# Patient Record
Sex: MALE | Race: BLACK OR AFRICAN AMERICAN | URBAN - METROPOLITAN AREA
[De-identification: names, ages, dates, MRNs, and addresses within clinical notes are randomized per-mention and may not be internally consistent; named-entity substitution may affect disease eponyms.]

---

## 2018-07-11 PROBLEM — R97.20 ELEVATED PROSTATE SPECIFIC ANTIGEN (PSA): Status: ACTIVE | Noted: 2018-07-11

## 2022-03-19 PROBLEM — R97.20 ELEVATED PROSTATE SPECIFIC ANTIGEN (PSA): Status: ACTIVE | Noted: 2018-07-11

## 2022-05-16 ENCOUNTER — OFFICE VISIT (OUTPATIENT)
Dept: VASCULAR SURGERY | Age: 72
End: 2022-05-16
Payer: MEDICARE

## 2022-05-16 VITALS
HEART RATE: 92 BPM | SYSTOLIC BLOOD PRESSURE: 122 MMHG | HEIGHT: 71 IN | WEIGHT: 220.9 LBS | BODY MASS INDEX: 30.93 KG/M2 | OXYGEN SATURATION: 97 % | DIASTOLIC BLOOD PRESSURE: 82 MMHG

## 2022-05-16 DIAGNOSIS — I72.4 POPLITEAL ARTERY ANEURYSM (HCC): Primary | ICD-10-CM

## 2022-05-16 PROCEDURE — 99204 OFFICE O/P NEW MOD 45 MIN: CPT | Performed by: SURGERY

## 2022-05-16 PROCEDURE — G8427 DOCREV CUR MEDS BY ELIG CLIN: HCPCS | Performed by: SURGERY

## 2022-05-16 PROCEDURE — G8417 CALC BMI ABV UP PARAM F/U: HCPCS | Performed by: SURGERY

## 2022-05-16 PROCEDURE — 1101F PT FALLS ASSESS-DOCD LE1/YR: CPT | Performed by: SURGERY

## 2022-05-16 PROCEDURE — 1123F ACP DISCUSS/DSCN MKR DOCD: CPT | Performed by: SURGERY

## 2022-05-16 PROCEDURE — G8510 SCR DEP NEG, NO PLAN REQD: HCPCS | Performed by: SURGERY

## 2022-05-16 PROCEDURE — G8536 NO DOC ELDER MAL SCRN: HCPCS | Performed by: SURGERY

## 2022-05-16 PROCEDURE — 3017F COLORECTAL CA SCREEN DOC REV: CPT | Performed by: SURGERY

## 2022-05-16 NOTE — PROGRESS NOTES
1. Have you been to the ER, urgent care clinic since your last visit? No      Hospitalized since your last visit? No     2. Have you seen or consulted any other health care providers outside of the [address] [place] [address] since your last visit? Include any pap smears or colon screening.   No

## 2022-05-25 NOTE — PROGRESS NOTES
Gar Sport  Chief Complaint   Patient presents with    New Patient    Groin Pain       History and Physical    72 y/o M presents to establish care with a vascular surgeon. He reports that he was diagnosed with bilateral popliteal aneurysm approx 10 years ago. At that time he was recommended to undergo a possible angiogram to further evaluate. He was dealing with other medical issues at the time, and didn't follow up. He has been diagnosed with DVT/PE, and completed anticoagulation in the past.     Currently, he denies claudication or rest pain. He does not know if he has been evaluated for an AAA.      Past Medical History:   Diagnosis Date    Anemia 12/15/2012    BPH (benign prostatic hypertrophy)     Colon polyps     Elevated PSA     Erectile dysfunction     Multiple-type hyperlipidemia 7/10/2007    Personal history of prostate cancer     Sickle cell trait ([facility] [address]     UTI (urinary tract infection)      Patient Active Problem List   Diagnosis Code    BPH with obstruction/lower urinary tract symptoms N40.1, N13.8    ED (erectile dysfunction) of organic origin N52.9    Sickle cell trait (HCC) D57.3    Vascular disorder of lower extremity I99.9    Anemia D64.9    Chronic kidney disease, stage II (mild) N18.2    Pulmonary infarction ([facility] [address] I26.99    Osteoarthritis of knee M17.10    Multiple-type hyperlipidemia E78.2    Morbid obesity ([facility] [address] E66.01    Abnormal liver function tests R79.89    Popliteal artery aneurysm (HCC) I72.4    Pre-syncope R55    Primary localized osteoarthritis M19.91    Syncope and collapse R55    Tobacco use Z72.0    Elevated prostate specific antigen (PSA) R97.20     Past Surgical History:   Procedure Laterality Date    BIOPSY PROSTATE       Current Outpatient Medications   Medication Sig Dispense Refill    clindamycin-benzoyl peroxide (BENZACLIN) 1-5 % topical gel APPLY TO FACE ONCE DAILY AFTER WASHING      ibuprofen (MOTRIN) 800 mg tablet Take 800 mg by mouth every six (6) hours as needed.  silodosin (RAPAFLO) 8 mg capsule Take 1 Capsule by mouth daily (with dinner). 90 Capsule 3    tadalafiL (CIALIS) 20 mg tablet Take 1 Tablet by mouth daily as needed (erectile dysfunction). 30 Tablet 5    tadalafiL (CIALIS) 5 mg tablet Take 1 Tablet by mouth daily. 90 Tablet 3    multivitamin (ONE A DAY) tablet Take 1 Tab by mouth daily.  OMEPRAZOLE MAGNESIUM (PRILOSEC OTC PO) Take  by mouth.  valACYclovir (VALTREX) 500 mg tablet   0     Allergies   Allergen Reactions    Avodart [Dutasteride] Shortness of Breath and Cough     Lightheaded    Oxycodone Nausea and Vomiting    Rosuvastatin Other (comments)     'flu'        Social History     Tobacco Use    Smoking status: Never Smoker    Smokeless tobacco: Never Used   Substance Use Topics    Alcohol use: Yes     Alcohol/week: 0.0 standard drinks     Comment: occ    Drug use: Not on file        Family History   Problem Relation Age of Onset    Heart Disease Father     Diabetes Sister     Diabetes Brother     Diabetes Maternal Grandmother        Review of Systems    General: negative for fever   Eyes: negative for vision loss   HENT: negative for cold symptoms   Respiratory negative for shortness of breath   Cardiac: negative for chest pain   Vascular negative for foot pain at night    Gastrointestinal: negative for abdominal pain   Genitourinary: negative for dysuria    Endocrine: negative for excessive thirst   Skin: negative for rash   Neurological: negative for paralysis   Psychiatric: negative for depression        Physical Exam:    Visit Vitals  /82 (BP 1 Location: Left arm, BP Patient Position: Sitting)   Pulse 92   Ht 5' 11\" (1.803 m)   Wt 220 lb 14.4 oz (100.2 kg)   SpO2 97%   BMI 30.81 kg/m²        Constitutional:  Patient is well developed, well nourished, and not distressed. HEENT: atraumatic, normocephalic, wearing a mask.    Eyes:   Cunjunctivae clear, no scleral icterus  Cardiovascular:  Normal rate, regular rhythm, normal heart sounds. Pulmonary/Chest: Effort normal and breath sounds normal.  he has no wheezes or no rales. Abdominal:   Soft. No tenderness to palpation. No masses. Extremities:   BLE warm. Pedal pulses not palpable bilaterally. Popliteal pulses are faintly palpable bilaterally, but do not feel aneurysmal on my exam. No edema of either lower extremity. Neurological:  he  is alert and oriented x3 Gait normal. Motor & sensory grossly intact in all 4 limbs. Psych: Appropriate mood and affect. Imaging/Studies:   None today. Impression:  Prior history of BLE popliteal aneurysms diagnosed an an outside institution. Asymptomatic currently. Signs of PAD on physical exam.     Plan: Will obtain MELCHOR, BLE arterial duplex to eval popliteal arteries, and will also screen for AAA. RTC after studies to discuss findings. Elisabeth Nguyen MD  Vascular Surgeon    I spent approximately 40 minutes on this patient encounter, consisting of reviewing the patient's chart, including imaging, primary care and consultant documentation, performing a history and physical exam, and speaking with him regarding my impression and plan.

## 2022-05-30 DIAGNOSIS — I72.4 POPLITEAL ARTERY ANEURYSM (HCC): ICD-10-CM

## 2022-06-01 LAB
ABDOMINAL AP: 2.3 CM
ABDOMINAL DIST AORTA VEL: 62.5 CM/S
ABDOMINAL LT COM ILIAC AP: 1.11 CM
ABDOMINAL LT INT ILIAC VEL: 84.3 CM/S
ABDOMINAL MID AORTA VEL: 50.2 CM/S
ABDOMINAL RT COM ILIAC AP: 1.28 CM
ABDOMINAL RT INT ILIAC VEL: 49 CM/S
ABDOMINAL TRANS: 3 CM
DIST AORTIC AP: 2.3 CM
DIST AORTIC TRANS: 3 CM
LEFT ABI: 1.19
LEFT ARM BP: 138 MMHG
LEFT COM ILIAC DIST VEL: 87.9 CM/S
LEFT COM ILIAC MID VEL: 64.6 CM/S
LEFT COM ILIAC PROX VEL: 56 CM/S
LEFT EXT ILIAC DIST VEL RATIO: 0.99
LEFT EXT ILIAC DIST VEL: 81.3 CM/S
LEFT EXT ILIAC MID VEL RATIO: 1
LEFT EXT ILIAC MID VEL: 82.5 CM/S
LEFT EXT ILIAC PROX VEL RATIO: 0.94
LEFT EXT ILIAC PROX VEL: 82.5 CM/S
LEFT POSTERIOR TIBIAL: 138 MMHG
LEFT TBI: 0.79
LEFT TOE PRESSURE: 109 MMHG
RIGHT ABI: 0.79
RIGHT ARM BP: 130 MMHG
RIGHT EXT ILIAC DIST VEL RATIO: 1
RIGHT EXT ILIAC DIST VEL: 79.7 CM/S
RIGHT EXT ILIAC MID VEL: 78.5 CM/S
RIGHT POSTERIOR TIBIAL: 109 MMHG
RIGHT TBI: 0.51
RIGHT TOE PRESSURE: 71 MMHG
VAS LEFT DORSALIS PEDIS BP: 164 MMHG
VAS RIGHT DORSALIS PEDIS BP: 101 MMHG

## 2022-06-20 ENCOUNTER — OFFICE VISIT (OUTPATIENT)
Dept: VASCULAR SURGERY | Age: 72
End: 2022-06-20
Payer: MEDICARE

## 2022-06-20 VITALS
BODY MASS INDEX: 30.93 KG/M2 | HEART RATE: 77 BPM | SYSTOLIC BLOOD PRESSURE: 130 MMHG | HEIGHT: 71 IN | WEIGHT: 220.9 LBS | OXYGEN SATURATION: 97 % | DIASTOLIC BLOOD PRESSURE: 86 MMHG

## 2022-06-20 DIAGNOSIS — M24.541 CONTRACTURE OF JOINT OF RIGHT HAND: ICD-10-CM

## 2022-06-20 DIAGNOSIS — I72.4 POPLITEAL ARTERY ANEURYSM (HCC): Primary | ICD-10-CM

## 2022-06-20 DIAGNOSIS — I73.9 PAD (PERIPHERAL ARTERY DISEASE) (HCC): ICD-10-CM

## 2022-06-20 DIAGNOSIS — I71.40 AAA (ABDOMINAL AORTIC ANEURYSM) WITHOUT RUPTURE: ICD-10-CM

## 2022-06-20 PROCEDURE — G8510 SCR DEP NEG, NO PLAN REQD: HCPCS | Performed by: SURGERY

## 2022-06-20 PROCEDURE — 3017F COLORECTAL CA SCREEN DOC REV: CPT | Performed by: SURGERY

## 2022-06-20 PROCEDURE — 99213 OFFICE O/P EST LOW 20 MIN: CPT | Performed by: SURGERY

## 2022-06-20 PROCEDURE — G8417 CALC BMI ABV UP PARAM F/U: HCPCS | Performed by: SURGERY

## 2022-06-20 PROCEDURE — G8427 DOCREV CUR MEDS BY ELIG CLIN: HCPCS | Performed by: SURGERY

## 2022-06-20 PROCEDURE — G8536 NO DOC ELDER MAL SCRN: HCPCS | Performed by: SURGERY

## 2022-06-20 PROCEDURE — 1101F PT FALLS ASSESS-DOCD LE1/YR: CPT | Performed by: SURGERY

## 2022-06-20 PROCEDURE — 1123F ACP DISCUSS/DSCN MKR DOCD: CPT | Performed by: SURGERY

## 2022-06-20 NOTE — PROGRESS NOTES
Herve Ma  Chief Complaint   Patient presents with    New Patient    Carotid Artery Stenosis       History and Physical    72 y/o M presents for follow up of recent arterial studies. At the last visit, he reported a hx of possible BLE popliteal aneurysms, which had been diagnosed several years ago. No changes since his last visit. He continues to walk for exercise and perform house and yard work frequently. When he walks with his wife, she walks faster and further. He has a hard time keeping up with her, due to cramping in his RLE, which resolves after rest. He does not feel that this is limiting his lifestyle in any way. He notes that he has an abnormality of his R hand that he would like to have evaluated.      Past Medical History:   Diagnosis Date    Anemia 12/15/2012    BPH (benign prostatic hypertrophy)     Colon polyps     Elevated PSA     Erectile dysfunction     Multiple-type hyperlipidemia 7/10/2007    Personal history of prostate cancer     Sickle cell trait ([facility] [address]     UTI (urinary tract infection)      Patient Active Problem List   Diagnosis Code    BPH with obstruction/lower urinary tract symptoms N40.1, N13.8    ED (erectile dysfunction) of organic origin N52.9    Sickle cell trait (HCC) D57.3    Vascular disorder of lower extremity I99.9    Anemia D64.9    Chronic kidney disease, stage II (mild) N18.2    Pulmonary infarction ([facility] [address] I26.99    Osteoarthritis of knee M17.10    Multiple-type hyperlipidemia E78.2    Morbid obesity ([facility] [address] E66.01    Abnormal liver function tests R79.89    Popliteal artery aneurysm (HCC) I72.4    Pre-syncope R55    Primary localized osteoarthritis M19.91    Syncope and collapse R55    Tobacco use Z72.0    Elevated prostate specific antigen (PSA) R97.20     Past Surgical History:   Procedure Laterality Date    BIOPSY PROSTATE       Current Outpatient Medications   Medication Sig Dispense Refill    clindamycin-benzoyl peroxide (BENZACLIN) 1-5 % topical gel APPLY TO FACE ONCE DAILY AFTER WASHING      ibuprofen (MOTRIN) 800 mg tablet Take 800 mg by mouth every six (6) hours as needed.  silodosin (RAPAFLO) 8 mg capsule Take 1 Capsule by mouth daily (with dinner). 90 Capsule 3    tadalafiL (CIALIS) 20 mg tablet Take 1 Tablet by mouth daily as needed (erectile dysfunction). 30 Tablet 5    tadalafiL (CIALIS) 5 mg tablet Take 1 Tablet by mouth daily. 90 Tablet 3    multivitamin (ONE A DAY) tablet Take 1 Tab by mouth daily.  OMEPRAZOLE MAGNESIUM (PRILOSEC OTC PO) Take  by mouth.  valACYclovir (VALTREX) 500 mg tablet   0     Allergies   Allergen Reactions    Avodart [Dutasteride] Shortness of Breath and Cough     Lightheaded    Oxycodone Nausea and Vomiting    Rosuvastatin Other (comments)     'flu'        Social History     Tobacco Use    Smoking status: Never Smoker    Smokeless tobacco: Never Used   Substance Use Topics    Alcohol use: Yes     Alcohol/week: 0.0 standard drinks     Comment: occ    Drug use: Not on file        Family History   Problem Relation Age of Onset    Heart Disease Father     Hypertension Father     Diabetes Sister     Diabetes Brother     Diabetes Maternal Grandmother        Review of Systems    General: negative for fever   Eyes: negative for vision loss   HENT: negative for cold symptoms   Respiratory negative for shortness of breath   Cardiac: negative for chest pain   Vascular negative for foot pain at night    Gastrointestinal: negative for abdominal pain   Genitourinary: negative for dysuria    Endocrine: negative for excessive thirst   Skin: negative for rash   Neurological: negative for paralysis   Psychiatric: negative for depression        Physical Exam:    Visit Vitals  Ht 5' 11\" (1.803 m)   Wt 220 lb 14.4 oz (100.2 kg)   BMI 30.81 kg/m²        Constitutional:  Patient is well developed, well nourished, and not distressed. HEENT: Atraumatic, normocephalic, wearing a mask.    Eyes: Cunjunctivae clear, no scleral icterus  Cardiovascular:  Normal rate, regular rhythm, normal heart sounds. Pulmonary/Chest: Effort normal and breath sounds normal.  he has no wheezes or no rales. Abdominal:   Soft, non-distended. No tenderness to palpation. No masses palpated. Extremities:   BLE warm. LLE pedal pulses palpable. RLE pedal pulses not appreciated. No atrophy or skin changes bilaterally. RUE with contracture of R 5th digit. Neurological:  he  is alert and oriented x3 Gait normal. Motor & sensory grossly intact in all 4 limbs. Psych: Appropriate mood and affect. Imaging/Studies:   5/16/22:   MELCHOR: RLE 0.79, L 1.19. Normal toe pressures. RLE occluded 1.6cm popliteal artery aneurysm. No sign of LLE popliteal aneurysm. Abdominal aorta duplex: aorta measures 2.3 x 3 cm. Poor visualization due to intestinal gas. Impression:  1. Occluded RLE small popliteal aneurysm with minimal outflow disease. 2. Small asymptomatic AAA  3. Chronic R hand pain with contracture      Plan:  Repeat MELCHOR and AAA duplex in 1 year  Will refer for evaluation by orthopedic hand surgery (Dr. Mack Harada)      Aline Sanabria MD  Vascular Surgeon      I spent approximately 20 minutes on this patient encounter, which includes but is not limited to performing a history and physical exam, reviewing primary care and consultant documentation, reviewing imaging/studies, and speaking with him regarding my impression and plan.

## 2022-06-20 NOTE — PROGRESS NOTES
1. Have you been to the ER, urgent care clinic since your last visit? No       Hospitalized since your last visit? No     2. Have you seen or consulted any other health care providers outside of the [address] [place] [address] since your last visit? Include any pap smears or colon screening.   No

## 2022-07-01 PROBLEM — I71.40 AAA (ABDOMINAL AORTIC ANEURYSM) WITHOUT RUPTURE: Status: ACTIVE | Noted: 2022-07-01

## 2022-07-01 PROBLEM — I73.9 PAD (PERIPHERAL ARTERY DISEASE) (HCC): Status: ACTIVE | Noted: 2022-07-01

## 2022-07-01 PROBLEM — I71.40 AAA (ABDOMINAL AORTIC ANEURYSM) WITHOUT RUPTURE (HCC): Status: ACTIVE | Noted: 2022-07-01

## 2022-07-02 PROBLEM — M24.549 CONTRACTURE OF JOINT OF HAND: Status: ACTIVE | Noted: 2022-07-02

## 2022-07-27 ENCOUNTER — OFFICE VISIT (OUTPATIENT)
Dept: ORTHOPEDIC SURGERY | Age: 72
End: 2022-07-27
Payer: MEDICARE

## 2022-07-27 VITALS — HEIGHT: 71 IN | TEMPERATURE: 97.8 F | WEIGHT: 220 LBS | BODY MASS INDEX: 30.8 KG/M2

## 2022-07-27 DIAGNOSIS — M15.2 DEGENERATIVE ARTHRITIS OF PROXIMAL INTERPHALANGEAL JOINT OF RING FINGER OF RIGHT HAND: ICD-10-CM

## 2022-07-27 DIAGNOSIS — M15.2 DEGENERATIVE ARTHRITIS OF PROXIMAL INTERPHALANGEAL JOINT OF LITTLE FINGER OF RIGHT HAND: ICD-10-CM

## 2022-07-27 DIAGNOSIS — S63.274S: Primary | ICD-10-CM

## 2022-07-27 PROCEDURE — 99203 OFFICE O/P NEW LOW 30 MIN: CPT | Performed by: ORTHOPAEDIC SURGERY

## 2022-07-27 PROCEDURE — 73130 X-RAY EXAM OF HAND: CPT | Performed by: ORTHOPAEDIC SURGERY

## 2022-07-27 PROCEDURE — G8417 CALC BMI ABV UP PARAM F/U: HCPCS | Performed by: ORTHOPAEDIC SURGERY

## 2022-07-27 PROCEDURE — G8432 DEP SCR NOT DOC, RNG: HCPCS | Performed by: ORTHOPAEDIC SURGERY

## 2022-07-27 PROCEDURE — 1123F ACP DISCUSS/DSCN MKR DOCD: CPT | Performed by: ORTHOPAEDIC SURGERY

## 2022-07-27 PROCEDURE — G8427 DOCREV CUR MEDS BY ELIG CLIN: HCPCS | Performed by: ORTHOPAEDIC SURGERY

## 2022-07-27 PROCEDURE — 1101F PT FALLS ASSESS-DOCD LE1/YR: CPT | Performed by: ORTHOPAEDIC SURGERY

## 2022-07-27 PROCEDURE — G8536 NO DOC ELDER MAL SCRN: HCPCS | Performed by: ORTHOPAEDIC SURGERY

## 2022-07-27 PROCEDURE — 3017F COLORECTAL CA SCREEN DOC REV: CPT | Performed by: ORTHOPAEDIC SURGERY

## 2022-07-27 NOTE — PROGRESS NOTES
Latonya Acuña is a 73 y.o. male right handed retiree. Worker's Compensation and legal considerations: none filed. Vitals:    07/27/22 1054   Temp: 97.8 °F (36.6 °C)   TempSrc: Temporal   Weight: 220 lb (99.8 kg)   Height: 5' 11\" (1.803 m)   PainSc:   0 - No pain   PainLoc: Hand           Chief Complaint   Patient presents with    Hand Pain     Contracture of joint rt hand         HPI: Patient presents today with complaints of contracture in right hand. He reports dislocating his fingers years ago and has had some difficulty with motion but minimal pain. Date of onset: Chronic    Injury: No    Prior Treatment:  No    Numbness/ Tingling: No      ROS: Review of Systems - General ROS: negative  Psychological ROS: negative  ENT ROS: negative  Allergy and Immunology ROS: negative  Hematological and Lymphatic ROS: negative  Respiratory ROS: no cough, shortness of breath, or wheezing  Cardiovascular ROS: no chest pain or dyspnea on exertion  Gastrointestinal ROS: no abdominal pain, change in bowel habits, or black or bloody stools  Musculoskeletal ROS: negative  Neurological ROS: negative  Dermatological ROS: negative    Past Medical History:   Diagnosis Date    Anemia 12/15/2012    BPH (benign prostatic hypertrophy)     Colon polyps     Elevated PSA     Erectile dysfunction     Multiple-type hyperlipidemia 7/10/2007    Personal history of prostate cancer     Sickle cell trait (HCC)     UTI (urinary tract infection)        Past Surgical History:   Procedure Laterality Date    BIOPSY PROSTATE         Current Outpatient Medications   Medication Sig Dispense Refill    clindamycin-benzoyl peroxide (BENZACLIN) 1-5 % topical gel APPLY TO FACE ONCE DAILY AFTER WASHING      ibuprofen (MOTRIN) 800 mg tablet Take 800 mg by mouth every six (6) hours as needed.  silodosin (RAPAFLO) 8 mg capsule Take 1 Capsule by mouth daily (with dinner).  90 Capsule 3    tadalafiL (CIALIS) 20 mg tablet Take 1 Tablet by mouth daily as needed (erectile dysfunction). 30 Tablet 5    tadalafiL (CIALIS) 5 mg tablet Take 1 Tablet by mouth daily. 90 Tablet 3    multivitamin (ONE A DAY) tablet Take 1 Tab by mouth daily.  OMEPRAZOLE MAGNESIUM (PRILOSEC OTC PO) Take  by mouth.  valACYclovir (VALTREX) 500 mg tablet   0       Allergies   Allergen Reactions    Avodart [Dutasteride] Shortness of Breath and Cough     Lightheaded    Oxycodone Nausea and Vomiting    Rosuvastatin Other (comments)     'flu'           PE:     Physical Exam  Vitals and nursing note reviewed. Constitutional:       General: He is not in acute distress. Appearance: Normal appearance. He is not ill-appearing. Cardiovascular:      Pulses: Normal pulses. Pulmonary:      Effort: Pulmonary effort is normal. No respiratory distress. Musculoskeletal:         General: Swelling and deformity present. No tenderness or signs of injury. Cervical back: Normal range of motion and neck supple. Right lower leg: No edema. Left lower leg: No edema. Skin:     General: Skin is warm and dry. Capillary Refill: Capillary refill takes less than 2 seconds. Neurological:      General: No focal deficit present. Mental Status: He is alert and oriented to person, place, and time. Right hand: There are chronic deformities to the PIP joints of the right little and ring fingers. These are not passively correctable. Imagin2022 3 views of right hand is significant for degenerative changes at the PIP joints of the little and ring fingers with chronic subluxation noted. ICD-10-CM ICD-9-CM    1. Dislocation of interphalangeal joint of right ring finger, sequela  S63.274S 905.6 AMB POC XRAY, HAND; 3+ VIEWS      2. 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  Degenerative arthritis of proximal interphalangeal joint of little finger of right hand  M15.2 715.94 Plan:     Anti-inflammatories and ice as needed. We discussed injections in the future if pain worsens. Follow-up and Dispositions    Return if symptoms worsen or fail to improve.           Plan was reviewed with patient, who verbalized agreement and understanding of the plan

## 2022-12-06 ENCOUNTER — TELEPHONE (OUTPATIENT)
Dept: VASCULAR SURGERY | Age: 72
End: 2022-12-06

## 2022-12-06 NOTE — TELEPHONE ENCOUNTER
Attempted to return patient's call at this time; no answer noted at number given; vm left to contact this office.

## 2022-12-07 ENCOUNTER — OFFICE VISIT (OUTPATIENT)
Dept: VASCULAR SURGERY | Age: 72
End: 2022-12-07
Payer: MEDICARE

## 2022-12-07 VITALS
WEIGHT: 218.03 LBS | SYSTOLIC BLOOD PRESSURE: 124 MMHG | DIASTOLIC BLOOD PRESSURE: 64 MMHG | HEIGHT: 71 IN | BODY MASS INDEX: 30.52 KG/M2 | OXYGEN SATURATION: 98 % | HEART RATE: 84 BPM

## 2022-12-07 DIAGNOSIS — I71.43 INFRARENAL ABDOMINAL AORTIC ANEURYSM (AAA) WITHOUT RUPTURE: Primary | ICD-10-CM

## 2022-12-07 DIAGNOSIS — I73.9 PAD (PERIPHERAL ARTERY DISEASE) (HCC): ICD-10-CM

## 2022-12-07 PROCEDURE — 3017F COLORECTAL CA SCREEN DOC REV: CPT | Performed by: PHYSICIAN ASSISTANT

## 2022-12-07 PROCEDURE — 1123F ACP DISCUSS/DSCN MKR DOCD: CPT | Performed by: PHYSICIAN ASSISTANT

## 2022-12-07 PROCEDURE — G8417 CALC BMI ABV UP PARAM F/U: HCPCS | Performed by: PHYSICIAN ASSISTANT

## 2022-12-07 PROCEDURE — G8536 NO DOC ELDER MAL SCRN: HCPCS | Performed by: PHYSICIAN ASSISTANT

## 2022-12-07 PROCEDURE — 99214 OFFICE O/P EST MOD 30 MIN: CPT | Performed by: PHYSICIAN ASSISTANT

## 2022-12-07 PROCEDURE — G8427 DOCREV CUR MEDS BY ELIG CLIN: HCPCS | Performed by: PHYSICIAN ASSISTANT

## 2022-12-07 PROCEDURE — 1101F PT FALLS ASSESS-DOCD LE1/YR: CPT | Performed by: PHYSICIAN ASSISTANT

## 2022-12-07 PROCEDURE — G8432 DEP SCR NOT DOC, RNG: HCPCS | Performed by: PHYSICIAN ASSISTANT

## 2022-12-07 NOTE — PROGRESS NOTES
Deepthishyann Celaya  Chief Complaint   Patient presents with    Follow-up    Abdominal Aortic Aneurysm       History and Physical    Kev Resendiz is a pleasant 74 y/o male presents for follow up known peripheral vascular disease is known to our practice from previous evaluation for his known AAA. Patient states that he was recently seen and was free of any symptoms, but since his last visit he has developed pain in his left thigh and radiating to his calf at short distance ambulation. Patient states that he can walk about 1 block and is thigh starts to burning pain him. Patient states that prior to this he has been very active, states that he walks frequently about a mile with his wife, and also bikes frequently. Patient states over the last couple weeks he has noticed substantial pain which is affected his ability to do these activities. . At the last visit, he reported a hx of possible BLE popliteal aneurysms, which had been diagnosed several years ago. Patient states that he continues to take his meds as recommended, denies taking a daily aspirin or statin medication. He states that since last visit he feels that this has gotten worse and that he is starting to affect his activities of daily living. On last visit, it was noted from the previous providers note :  No changes since his last visit. He continues to walk for exercise and perform house and yard work frequently. When he walks with his wife, she walks faster and further. He has a hard time keeping up with her, due to cramping in his RLE, which resolves after rest. He does not feel that this is limiting his lifestyle in any way. He notes that he has an abnormality of his R hand that he would like to have evaluated.      Past Medical History:   Diagnosis Date    Anemia 12/15/2012    BPH (benign prostatic hypertrophy)     Colon polyps     Elevated PSA     Erectile dysfunction     Multiple-type hyperlipidemia 7/10/2007    Personal history of prostate cancer Sickle cell trait ([facility])     UTI (urinary tract infection)      Patient Active Problem List   Diagnosis Code    BPH with obstruction/lower urinary tract symptoms N40.1, N13.8    ED (erectile dysfunction) of organic origin N52.9    Sickle cell trait ([facility]) D57.3    Vascular disorder of lower extremity I99.9    Anemia D64.9    Chronic kidney disease, stage II (mild) N18.2    Pulmonary infarction ([facility]) I26.99    Osteoarthritis of knee M17.9    Multiple-type hyperlipidemia E78.2    Morbid obesity ([facility]) E66.01    Abnormal liver function tests R79.89    Popliteal artery aneurysm ([facility]) I72.4    Pre-syncope R55    Primary localized osteoarthritis M19.91    Syncope and collapse R55    Tobacco use Z72.0    Elevated prostate specific antigen (PSA) R97.20    AAA (abdominal aortic aneurysm) without rupture I71.40    PAD (peripheral artery disease) ([facility]) I73.9    Contracture of joint of hand M24.549     Past Surgical History:   Procedure Laterality Date    BIOPSY PROSTATE      HX UROLOGICAL  09/01/2022    TRANSRECTAL ULTRASOUND GUIDED BIOPSY OF PROSTATE; Dr. Pattie Gant     Current Outpatient Medications   Medication Sig Dispense Refill    trimethoprim-sulfamethoxazole (BACTRIM DS, SEPTRA DS) 160-800 mg per tablet Take 1 tablet two hours prior to TRUS biopsy. Take second tablet 12 hours after TRUS biopsy. 2 Tablet 0    clindamycin-benzoyl peroxide (BENZACLIN) 1-5 % topical gel APPLY TO FACE ONCE DAILY AFTER WASHING      ibuprofen (MOTRIN) 800 mg tablet Take 800 mg by mouth every six (6) hours as needed. silodosin (RAPAFLO) 8 mg capsule Take 1 Capsule by mouth daily (with dinner). 90 Capsule 3    tadalafiL (CIALIS) 20 mg tablet Take 1 Tablet by mouth daily as needed (erectile dysfunction). 30 Tablet 5    tadalafiL (CIALIS) 5 mg tablet Take 1 Tablet by mouth daily. 90 Tablet 3    multivitamin (ONE A DAY) tablet Take 1 Tab by mouth daily. OMEPRAZOLE MAGNESIUM (PRILOSEC OTC PO) Take  by mouth.       valACYclovir (VALTREX) 500 mg tablet   0     Allergies   Allergen Reactions    Avodart [Dutasteride] Shortness of Breath and Cough     Lightheaded    Oxycodone Nausea and Vomiting    Rosuvastatin Other (comments)     'flu'        Social History     Tobacco Use    Smoking status: Never    Smokeless tobacco: Never   Substance Use Topics    Alcohol use: Yes     Alcohol/week: 0.0 standard drinks     Comment: occ        Family History   Problem Relation Age of Onset    Heart Disease Father     Hypertension Father     Diabetes Sister     Diabetes Brother     Diabetes Maternal Grandmother        Review of Systems    General: negative for fever-chills. Ambulates daily but is experiencing left leg pain and discomfort at 1-2 blocks ambulation. Denies any recent ulceration skin rashes or skin lesions in either lower extremity. Eyes: negative for vision loss   HENT: negative for cold symptoms   Respiratory negative for shortness of breath   Cardiac: negative for chest pain   Vascular negative for foot pain at night    Gastrointestinal: negative for abdominal pain   Genitourinary: negative for dysuria    Endocrine: negative for excessive thirst   Skin: negative for rash   Neurological: negative for paralysis   Psychiatric: negative for depression        Physical Exam:    Visit Vitals  /64 (BP 1 Location: Left arm, BP Patient Position: Sitting)   Pulse 84   Ht 5' 11\" (1.803 m)   Wt 218 lb 0.6 oz (98.9 kg)   SpO2 98%   BMI 30.41 kg/m²        Constitutional:  Patient is well developed, well nourished, and not distressed. Ambulated into the room albeit slowly with a right-sided limp but normal steady gait. HEENT: Atraumatic, normocephalic, wearing a mask. Eyes:   Cunjunctivae clear, no scleral icterus  Cardiovascular:  Normal rate, regular rhythm, normal heart sounds. Pulmonary/Chest: Effort normal and breath sounds normal.  he has no wheezes or no rales. Abdominal:   Soft, non-distended. No tenderness to palpation.  No masses palpated. Extremities:   BLE warm. LLE pedal pulses non-palpable. RLE pedal pulses not palpable. Easily obtain monophasic signal noted bilaterally. No atrophy or skin changes bilaterally. RUE with contracture of R 5th digit. Palpable radial pulses noted bilaterally. Neurovascularly intact to both soft and deep sensation bilaterally. Neurological: Patient is alert and oriented x3 Gait normal. Motor & sensory grossly intact in all 4 limbs. No focal deficits noted  Psych: Appropriate mood and affect. Imaging/Studies:   5/16/22: Studies  MELCHOR: RLE 0.79, L 1.19. Normal toe pressures. RLE occluded 1.6cm popliteal artery aneurysm. No sign of LLE popliteal aneurysm. Abdominal aorta duplex: aorta measures 2.3 x 3 cm. Poor visualization due to intestinal gas. Impression  - peripheral vascular disease  Continue present Rx -maintain daily meds as ordered, patient instructed to start baby aspirin 81 mg daily for vascular protection. Patient to discuss with his primary care doctor. CTA of the abdomen pelvis with bilateral runoffs ordered, follow-up in 2 to 3 weeks post imaging to discuss results and whether any intervention is needed. Instructed noted from previous imaging  1. Occluded RLE small popliteal aneurysm with minimal outflow disease. 2. Small asymptomatic AAA  3. Chronic R hand pain with contracture      Plan:  CTA of the abdomen and pelvis as soon as possible and follow-up to discuss results  Repeat  AAA duplex in 1 year  Patient instructed in the meantime to make better lifestyle choices, better nutritional choices, increasing his daily activity as tolerated, core smoking cessation. Patient states he understands above, is very appreciative of our service, and is willing to proceed as planned. Follow-up in 2 to 3 weeks with results review. On next visit we will discuss results and see if the patient needs any vascular intervention.       Lisa Kumar PA-C       I spent approximately 20 minutes on this patient encounter, which includes but is not limited to performing a history and physical exam, reviewing primary care and consultant documentation, reviewing imaging/studies, and speaking with him regarding my impression and plan.

## 2022-12-07 NOTE — PROGRESS NOTES
1. Have you been to the ER, urgent care clinic since your last visit? No     Hospitalized since your last visit? No     2. Have you seen or consulted any other health care providers outside of the [address] [place] [address] since your last visit? Include any pap smears or colon screening.   No

## 2022-12-21 DIAGNOSIS — I71.43 INFRARENAL ABDOMINAL AORTIC ANEURYSM (AAA) WITHOUT RUPTURE: ICD-10-CM

## 2022-12-21 DIAGNOSIS — I73.9 PAD (PERIPHERAL ARTERY DISEASE) (HCC): ICD-10-CM

## 2022-12-23 ENCOUNTER — HOSPITAL ENCOUNTER (OUTPATIENT)
Dept: CT IMAGING | Age: 72
Discharge: HOME | End: 2022-12-23
Attending: PHYSICIAN ASSISTANT
Payer: MEDICARE

## 2022-12-23 LAB — CREAT UR-MCNC: 1.5 MG/DL (ref 0.6–1.3)

## 2022-12-23 PROCEDURE — 82565 ASSAY OF CREATININE: CPT

## 2022-12-23 PROCEDURE — 75635 CT ANGIO ABDOMINAL ARTERIES: CPT

## 2022-12-23 PROCEDURE — 74011000636 HC RX REV CODE- 636: Performed by: PHYSICIAN ASSISTANT

## 2022-12-23 RX ADMIN — IOPAMIDOL 96 ML: 755 INJECTION, SOLUTION INTRAVENOUS at 10:23

## 2022-12-30 ENCOUNTER — OFFICE VISIT (OUTPATIENT)
Dept: VASCULAR SURGERY | Age: 72
End: 2022-12-30
Payer: MEDICARE

## 2022-12-30 VITALS
BODY MASS INDEX: 31.36 KG/M2 | HEIGHT: 71 IN | WEIGHT: 224 LBS | DIASTOLIC BLOOD PRESSURE: 62 MMHG | OXYGEN SATURATION: 100 % | HEART RATE: 91 BPM | SYSTOLIC BLOOD PRESSURE: 115 MMHG

## 2022-12-30 DIAGNOSIS — I72.4 POPLITEAL ARTERY ANEURYSM (HCC): ICD-10-CM

## 2022-12-30 DIAGNOSIS — I73.9 PAD (PERIPHERAL ARTERY DISEASE) (HCC): Primary | ICD-10-CM

## 2022-12-30 DIAGNOSIS — I71.43 INFRARENAL ABDOMINAL AORTIC ANEURYSM (AAA) WITHOUT RUPTURE: ICD-10-CM

## 2022-12-30 PROCEDURE — 1123F ACP DISCUSS/DSCN MKR DOCD: CPT | Performed by: PHYSICIAN ASSISTANT

## 2022-12-30 PROCEDURE — 1101F PT FALLS ASSESS-DOCD LE1/YR: CPT | Performed by: PHYSICIAN ASSISTANT

## 2022-12-30 PROCEDURE — G8417 CALC BMI ABV UP PARAM F/U: HCPCS | Performed by: PHYSICIAN ASSISTANT

## 2022-12-30 PROCEDURE — G8432 DEP SCR NOT DOC, RNG: HCPCS | Performed by: PHYSICIAN ASSISTANT

## 2022-12-30 PROCEDURE — G8536 NO DOC ELDER MAL SCRN: HCPCS | Performed by: PHYSICIAN ASSISTANT

## 2022-12-30 PROCEDURE — 99214 OFFICE O/P EST MOD 30 MIN: CPT | Performed by: PHYSICIAN ASSISTANT

## 2022-12-30 PROCEDURE — 3017F COLORECTAL CA SCREEN DOC REV: CPT | Performed by: PHYSICIAN ASSISTANT

## 2022-12-30 PROCEDURE — G8427 DOCREV CUR MEDS BY ELIG CLIN: HCPCS | Performed by: PHYSICIAN ASSISTANT

## 2022-12-30 RX ORDER — ASPIRIN 81 MG/1
81 TABLET ORAL DAILY
Qty: 90 TABLET | Refills: 3 | Status: SHIPPED | OUTPATIENT
Start: 2022-12-30

## 2022-12-30 NOTE — PROGRESS NOTES
1. Have you been to the ER, urgent care clinic since your last visit? No Hospitalized since your last visit? No    2. Have you seen or consulted any other health care providers outside of the [address] [place] [address] since your last visit? Include any pap smears or colon screening.  No

## 2022-12-30 NOTE — PROGRESS NOTES
Delford Homans  Chief Complaint   Patient presents with    Abdominal Aortic Aneurysm     Follow up with CTA       History and Physical    Hreve Bello is a pleasant 72 y/o male presents for follow up known peripheral vascular disease is known to our practice from previous evaluation for his known AAA. Patient was recently seen in the office and sent for a CT scan of the abdomen pelvis with bilateral runoffs and returns today for results review. Reports that since his last visit he is feeling a little better. Patient reports that he is feeling a little better since his last visit. States that since his last visit he recently bought a stationary bike and has been utilizing at home. Does get some pain after half hour usage but has been able to increase his distance daily. Notes from previous visit  Patient states that he was recently seen and was free of any symptoms, but since his last visit he has developed pain in his left thigh and radiating to his calf at short distance ambulation. Patient states that he can walk about 1 block and is thigh starts to burning pain him. Patient states that prior to this he has been very active, states that he walks frequently about a mile with his wife, and also bikes frequently. Patient states over the last couple weeks he has noticed substantial pain which is affected his ability to do these activities. . At the last visit, he reported a hx of possible BLE popliteal aneurysms, which had been diagnosed several years ago. Patient states that he continues to take his meds as recommended, denies taking a daily aspirin or statin medication. He states that since last visit he feels that this has gotten worse and that he is starting to affect his activities of daily living. On last visit, it was noted from the previous providers note :  No changes since his last visit. He continues to walk for exercise and perform house and yard work frequently.  When he walks with his wife, she walks faster and further. He has a hard time keeping up with her, due to cramping in his RLE, which resolves after rest. He does not feel that this is limiting his lifestyle in any way. He notes that he has an abnormality of his R hand that he would like to have evaluated. Past Medical History:   Diagnosis Date    Anemia 12/15/2012    BPH (benign prostatic hypertrophy)     Colon polyps     Elevated PSA     Erectile dysfunction     Multiple-type hyperlipidemia 7/10/2007    Personal history of prostate cancer     Sickle cell trait ([facility])     UTI (urinary tract infection)      Patient Active Problem List   Diagnosis Code    BPH with obstruction/lower urinary tract symptoms N40.1, N13.8    ED (erectile dysfunction) of organic origin N52.9    Sickle cell trait ([facility]) D57.3    Vascular disorder of lower extremity I99.9    Anemia D64.9    Chronic kidney disease, stage II (mild) N18.2    Pulmonary infarction ([facility]) I26.99    Osteoarthritis of knee M17.9    Multiple-type hyperlipidemia E78.2    Morbid obesity ([facility]) E66.01    Abnormal liver function tests R79.89    Popliteal artery aneurysm ([facility]) I72.4    Pre-syncope R55    Primary localized osteoarthritis M19.91    Syncope and collapse R55    Tobacco use Z72.0    Elevated prostate specific antigen (PSA) R97.20    AAA (abdominal aortic aneurysm) without rupture I71.40    PAD (peripheral artery disease) ([facility]) I73.9    Contracture of joint of hand M24.549     Past Surgical History:   Procedure Laterality Date    BIOPSY PROSTATE      HX UROLOGICAL  09/01/2022    TRANSRECTAL ULTRASOUND GUIDED BIOPSY OF PROSTATE; Dr. Yoel Burrows     Current Outpatient Medications   Medication Sig Dispense Refill    aspirin delayed-release 81 mg tablet Take 1 Tablet by mouth daily.  90 Tablet 3    silodosin (RAPAFLO) 8 mg capsule take 1 cap by mouth daily with dinner 90 Capsule 1    trimethoprim-sulfamethoxazole (BACTRIM DS, SEPTRA DS) 160-800 mg per tablet Take 1 tablet two hours prior to TRUS biopsy. Take second tablet 12 hours after TRUS biopsy. 2 Tablet 0    clindamycin-benzoyl peroxide (BENZACLIN) 1-5 % topical gel APPLY TO FACE ONCE DAILY AFTER WASHING      ibuprofen (MOTRIN) 800 mg tablet Take 800 mg by mouth every six (6) hours as needed. tadalafiL (CIALIS) 20 mg tablet Take 1 Tablet by mouth daily as needed (erectile dysfunction). 30 Tablet 5    tadalafiL (CIALIS) 5 mg tablet Take 1 Tablet by mouth daily. 90 Tablet 3    multivitamin (ONE A DAY) tablet Take 1 Tab by mouth daily. OMEPRAZOLE MAGNESIUM (PRILOSEC OTC PO) Take  by mouth. valACYclovir (VALTREX) 500 mg tablet   0     Allergies   Allergen Reactions    Avodart [Dutasteride] Shortness of Breath and Cough     Lightheaded    Oxycodone Nausea and Vomiting    Rosuvastatin Other (comments)     'flu'        Social History     Tobacco Use    Smoking status: Never    Smokeless tobacco: Never   Substance Use Topics    Alcohol use: Yes     Alcohol/week: 0.0 standard drinks     Comment: occ        Family History   Problem Relation Age of Onset    Heart Disease Father     Hypertension Father     Diabetes Sister     Diabetes Brother     Diabetes Maternal Grandmother        Review of Systems    General: negative for fever-chills. Ambulates daily but is experiencing left leg pain and discomfort at 1-2 blocks ambulation. Denies any recent ulceration skin rashes or skin lesions in either lower extremity.   Patient states that he has recently obtained   Eyes: negative for vision loss   HENT: negative for cold symptoms   Respiratory negative for shortness of breath   Cardiac: negative for chest pain   Vascular negative for foot pain at night    Gastrointestinal: negative for abdominal pain   Genitourinary: negative for dysuria    Endocrine: negative for excessive thirst   Skin: negative for rash   Neurological: negative for paralysis   Psychiatric: negative for depression        Physical Exam:    Visit Vitals  /62   Pulse 91   Ht 5' 11\" (1.803 m)   Wt 224 lb (101.6 kg)   SpO2 100%   BMI 31.24 kg/m²        Constitutional:  Patient is well developed, well nourished, and not distressed. Ambulated into the room albeit slowly with a right-sided limp but normal steady gait. HEENT: Atraumatic, normocephalic, wearing a mask. Eyes:   Cunjunctivae clear, no scleral icterus  Cardiovascular:  Normal rate, regular rhythm, normal heart sounds. Pulmonary/Chest: Effort normal and breath sounds normal.  he has no wheezes or no rales. Abdominal:   Soft, non-distended. No tenderness to palpation. No masses palpated. Extremities:   BLE warm. LLE pedal pulses non-palpable. RLE pedal pulses not palpable. Easily obtain monophasic signal noted bilaterally. No atrophy or skin changes bilaterally. RUE with contracture of R 5th digit. Palpable radial pulses noted bilaterally. Neurovascularly intact to both soft and deep sensation bilaterally. Neurological: Patient is alert and oriented x3 Gait normal. Motor & sensory grossly intact in all 4 limbs. No focal deficits noted  Psych: Appropriate mood and affect. Imaging/Studies:   5/16/22: Studies  MELCHOR: RLE 0.79, L 1.19. Normal toe pressures. RLE occluded 1.6cm popliteal artery aneurysm. No sign of LLE popliteal aneurysm. Abdominal aorta duplex: aorta measures 2.3 x 3 cm. Poor visualization due to intestinal gas.        Impression  - peripheral vascular disease  Continue present Rx -maintain daily meds as ordered, patient instructed to start baby aspirin 81 mg daily for vascular protection  Previsit imaging reviewed, results discussed with patient  EXAM:  CTA ABDOMEN-PELVIS WITH BILATERAL LOWER EXTREMITY RUNOFF WITH CONTRAST     CLINICAL INDICATION/ HISTORY:       - Infrarenal abdominal aortic aneurysm (AAA) without rupture - I71.43   - PAD (peripheral artery disease) (HCC) - I73.9  - New onset history of claudication at 1 block to left lower extremity     TECHNIQUE:  Helical volumetric CT imaging of the abdomen, pelvis and lower  extremities is performed following IV contrast administration (120 mL  [id number]-370). From these volumetric source data, sagittal and coronal  reconstructions are also generated. Additionally at a separate work station,  MIP (maximal intensity projection) images are also generated along with volume  rendered images (with and without surface shading and without and with bone  shadowing), curved planar images at various segments and segmental cross  sectional images. CT scans at this facility are performed using dose  optimization technique as appropriate with performed exam, to include automated  exposure control, adjustment of mA and/or kV according to patient's size  (including appropriate matching for site-specific examinations), or use of  iterative reconstruction technique. COMPARISON:  None     FINDINGS:     CTA Vascular Evaluation:     Aorta:  Smooth contour of the aorta without acute abnormalities. No evidence of  aneurysmal dilation and the abdominal aorta. Visceral Arteries:  Patent celiac and superior mesenteric arteries. Patent  renal arteries bilaterally. The left kidney is supplied with 3 renal arteries. The right kidney is supplied with main renal artery and an accessory renal  artery which appears to arise from the right phrenic artery. Patent inferior  mesenteric artery. Iliac Arteries:     - Right common iliac artery:  Patent  - Right external iliac artery:  Patent  - Right internal iliac artery:  Mild narrowing in the proximal segment, with  mild fusiform dilation distal to the area of dilation, measuring up to about 1.0  cm in diameter.     - Left common iliac artery:  Patent  - Left external iliac artery:  Patent  - Left internal iliac artery:  Mild narrowing and the proximal segment with  fusiform dilation more distally, measuring up to about 0.9 cm. Right Lower Extremity Arteries:      - Right common femoral artery:  Patent.   - Right superficial femoral artery:  Patent  - Right profunda femoris artery:  Patent  - Right popliteal artery:  Patent proximally with some narrowing from  atherosclerotic plaques. Obscured by streak artifact from the adjacent knee  replacement metallic hardware. There is suspected thrombosis/ occlusion of the  popliteal artery from the mid segment to just above the origin of the anterior  tibial artery. - Right anterior tibial artery:  Patent with some atherosclerotic plaque  calcifications in the proximal segment. Scattered foci of suspected slight  atherosclerotic narrowing. The anterior tibial artery supplies the dorsalis  pedis. - Right tibioperoneal trunk:  Small caliber tibioperoneal trunk. Patent. - Right posterior tibial artery:  Mild to moderate atherosclerotic  calcifications in the proximal segment. Patent but small caliber posterior  tibial artery. - Right peroneal artery:  Patent but small caliber peroneal artery. Left Lower Extremity Arteries:      - Left common femoral artery:  Patent. - Left superficial femoral artery:  Patent  - Left profunda femoris artery:  Patent  - Left popliteal artery:  Patent  - Left anterior tibial artery:  Patent in the proximal segment. Intermittent  narrowing and eventual occlusion in the mid and distal segments. There is  intermittent luminal opacification presumably via collateral supply. - Left tibioperoneal trunk:  Patent. - Left posterior tibial artery:  Mild narrowing in the proximal segment. Otherwise patent and is the main runoff artery of the calf. - Left peroneal artery:  Patent. Nonvascular Lower Extremity Evaluation:     Bones, Joints:     - No acute fracture or subluxation bilaterally. - Status post total right knee joint replacement. Fragmented appearance of the  right patella superior aspect with quadriceps tendons thickening. Moderate  right knee joint effusion. The source for the joint effusion is unclear.   - Mild marginal osteophytes in all 3 compartments of the left knee. - Marginal osteophyte development in the bilateral ankles. - Marginal osteophytes also identified in the mid tonsil regions in both feet. Soft Tissue:     - Fatty infiltration of the right hamstring muscle groups. Most pronounced in  the semitendinosus muscle. Additional slight fatty infiltration in the  semimembranosus and biceps femoris. Moderate fatty infiltration in the left  semimembranosus muscle. - Thickening of the right quadriceps tendon at the suprapatellar region. CT Abdomen-Pelvis     Lower Chest:  No airspace opacities. Atelectatic changes both lower lobes  secondary to the large hiatal hernia sac. Liver:  Hepatosteatosis. Gallbladder, Adrenal Glands, Spleen, Pancreas:  Negative. Kidneys:  1.9 x 1.8 cm cyst in the left kidney. Tiny hypodensity in the right  kidney measuring about 0.5 x 0.4 cm (axial #240), favor small renal cyst.     GI Tract:  Large hiatal hernia. Nearly the entirety of the stomach is located  above the level of the diaphragmatic hiatus. No acute small bowel  abnormalities. The appendix is normal.  No acute colon abnormalities. Moderate  diverticulosis coli. Nodes:  No mesenteric or retroperitoneal lymphadenopathy. Body Wall:  Mild diastasis recti with umbilical hernia containing only fatty  tissue. Bladder:  Underdistended but otherwise unremarkable urinary bladder. Moderate  prostate enlargement. Bones:  No lytic lesions or acute bony abnormalities. Bilateral spondylolysis  at L5. IMPRESSION     1. No acute aortic abnormalities. 2.  The right popliteal artery is obscured for the most part by streak artifact  from the adjacent knee replacement metallic hardware. Suspected thrombosis/  occlusion of the popliteal artery from the mid segment. Clearly demonstrated  thrombosis/ occlusion in the distal portion to just above the origin of the  anterior tibial artery.      - Right anterior tibial artery with scattered foci of suspected slight  atherosclerotic narrowing. The anterior tibial artery supplies the dorsalis  pedis. - All three calf runoff arteries are patent but small in caliber. 3.  Left anterior tibial artery with intermittent narrowing and eventual  occlusion in the mid and distal segments. There is intermittent luminal  opacification presumably via collateral supply. Other runoff arteries are  patent. 4.  Moderate right knee joint effusion. The source for the joint effusion is  unclear. Status post total knee joint replacement. Thickening of the  quadriceps tendon at the suprapatellar region with fragmentation of the superior  margin of the patella. 5.  Fatty infiltration of at least portions of the hamstring muscles  bilaterally, right greater than left. 6.  Renal hypodensities suggestive of renal cysts. 7.  L5 spondylolysis with grade 1 spondylolisthesis. Patient instructed to given above findings we will continue with observation and surveillance. Findings does show some mild disease but no vascular intervention needed, which we will continue with observation and surveillance. Repeat  AAA duplex in 1 year  Patient instructed in the meantime to make better lifestyle choices, better nutritional choices, increasing his daily activity as tolerated, core smoking cessation. Patient states he understands above, is very appreciative of our service, and is willing to proceed as planned. Follow-up sooner if any questions or issues. I will discuss details with my attending    Debbie Castorena PA-C       I spent approximately 20 minutes on this patient encounter, which includes but is not limited to performing a history and physical exam, reviewing primary care and consultant documentation, reviewing imaging/studies, and speaking with him regarding my impression and plan.

## 2023-06-29 DIAGNOSIS — I73.9 PERIPHERAL VASCULAR DISEASE, UNSPECIFIED (HCC): Primary | ICD-10-CM

## 2023-06-30 ENCOUNTER — OFFICE VISIT (OUTPATIENT)
Age: 73
End: 2023-06-30
Payer: MEDICARE

## 2023-06-30 VITALS
DIASTOLIC BLOOD PRESSURE: 78 MMHG | RESPIRATION RATE: 16 BRPM | HEIGHT: 71 IN | SYSTOLIC BLOOD PRESSURE: 120 MMHG | BODY MASS INDEX: 31.36 KG/M2 | OXYGEN SATURATION: 98 % | HEART RATE: 76 BPM | WEIGHT: 224 LBS

## 2023-06-30 DIAGNOSIS — I72.4 ANEURYSM OF ARTERY OF LOWER EXTREMITY (HCC): ICD-10-CM

## 2023-06-30 DIAGNOSIS — I73.9 PERIPHERAL VASCULAR DISEASE, UNSPECIFIED (HCC): Primary | ICD-10-CM

## 2023-06-30 DIAGNOSIS — I71.43 INFRARENAL ABDOMINAL AORTIC ANEURYSM, WITHOUT RUPTURE (HCC): ICD-10-CM

## 2023-06-30 PROCEDURE — 1123F ACP DISCUSS/DSCN MKR DOCD: CPT | Performed by: PHYSICIAN ASSISTANT

## 2023-06-30 PROCEDURE — 99214 OFFICE O/P EST MOD 30 MIN: CPT | Performed by: PHYSICIAN ASSISTANT

## 2023-07-03 NOTE — PROGRESS NOTES
Baljit Hoskins  Chief Complaint   Patient presents with    Abdominal Aortic Aneurysm     Follow up with CTA       History and Physical    Alex Humphreys is a pleasant 72 y/o male presents for follow up known peripheral vascular disease is known to our practice from previous evaluation for questionable  known AAA. Patient was recently seen in the office and sent for bilateral lower extremity arterial imaging and returns today for results review. Reports that since his last visit he is feeling a little better. Patient reports that he is feeling a little better since his last visit. States that since his last visit he recently bought a stationary bike and has been utilizing at home. Does get some pain after half hour usage but has been able to increase his distance daily. Notes from previous visit  Patient states that he was recently seen and was free of any symptoms, but since his last visit he has developed pain in his left thigh and radiating to his calf at short distance ambulation. Patient states that he can walk about 1 block and is thigh starts to burning pain him. Patient states that prior to this he has been very active, states that he walks frequently about a mile with his wife, and also bikes frequently. Patient states over the last couple weeks he has noticed substantial pain which is affected his ability to do these activities. . At the last visit, he reported a hx of possible BLE popliteal aneurysms, which had been diagnosed several years ago. Patient states that he continues to take his meds as recommended, denies taking a daily aspirin or statin medication. He states that since last visit he feels that this has gotten worse and that he is starting to affect his activities of daily living. On last visit, it was noted from the previous providers note :  No changes since his last visit. He continues to walk for exercise and perform house and yard work frequently.  When he walks with his wife, she

## 2024-02-15 ENCOUNTER — OFFICE VISIT (OUTPATIENT)
Age: 74
End: 2024-02-15
Payer: MEDICARE

## 2024-02-15 VITALS
DIASTOLIC BLOOD PRESSURE: 62 MMHG | HEART RATE: 75 BPM | HEIGHT: 71 IN | WEIGHT: 221 LBS | BODY MASS INDEX: 30.94 KG/M2 | SYSTOLIC BLOOD PRESSURE: 134 MMHG | OXYGEN SATURATION: 97 % | RESPIRATION RATE: 18 BRPM

## 2024-02-15 DIAGNOSIS — I71.43 INFRARENAL ABDOMINAL AORTIC ANEURYSM, WITHOUT RUPTURE (HCC): ICD-10-CM

## 2024-02-15 DIAGNOSIS — I73.9 PERIPHERAL VASCULAR DISEASE, UNSPECIFIED (HCC): Primary | ICD-10-CM

## 2024-02-15 PROCEDURE — 99214 OFFICE O/P EST MOD 30 MIN: CPT | Performed by: PHYSICIAN ASSISTANT

## 2024-02-15 PROCEDURE — 1123F ACP DISCUSS/DSCN MKR DOCD: CPT | Performed by: PHYSICIAN ASSISTANT

## 2024-02-15 NOTE — PROGRESS NOTES
(Order #[id number]) on 1/12/24     Reason for Exam  Priority: Routine  AAA without rupture   Dx: Aneurysm of artery of lower extremity (HCC) [I72.4 (ICD-10-CM)]; Infrarenal abdominal aortic aneurysm, without rupture (HCC) [I71.43 (ICD-10-CM)]   Comments: Patient with a remote history of AAA, ultrasound to determine validity.     PACS Images     Show images for Vascular duplex aorta/IVC/iliac/bypass graft complete  Interpretation Summary         Patent distal aorta with no evidence stenosis or aneurysmal change. Suprarenal aorta to mid aorta and visceral branches not visualized due to excessive gas.    Patent bilateral external and common iliac arteries with no evidence of aneurysmal change or stenosis.    When compared with previous exam dated 06/01/2022, the previous aneurysm (poorly visualized due to gas) was not reproduced on todays exam. Aorta diameter was clearly defined and measured at the distal segment on todays exam.           Procedure Details    A gray scale, color Doppler imaging and spectral Doppler analysis ultrasound was performed. During the study longitudinal and transverse views were obtained. Pulsed wave doppler was performed. Overall the study quality was technically difficult. Study was technically difficult due to: bowel gas.     Abdominal Findings    Abdominal Aorta    No abdominal aortic aneurysm (AAA) is present.     Proximal Aorta: Not well visualized.   Middle Aorta: Not well visualized.   Renal    Bilateral renal arteries are not visualized due to excessive gas.   Mesenteric    Celiac Artery: Not visualized.   Superior Mesenteric Artery not visualized.   Inferior Mesenteric Artery not visualized.   Celiac, SMA and SUZANNE are not visualized due to excessive gas.     Abdominal Aorta Measurements     PSV AP TR   Mid Ao 84.5 cm/s                 Dist Ao 49 cm/s       2.32 cm       2.94 cm           Iliac Artery Measurements     Right PSV Right AP Left PSV Left AP   NETO     1.22 cm           1.2

## 2025-03-12 ENCOUNTER — OFFICE VISIT (OUTPATIENT)
Age: 75
End: 2025-03-12
Payer: MEDICARE

## 2025-03-12 VITALS
HEART RATE: 83 BPM | HEIGHT: 71 IN | DIASTOLIC BLOOD PRESSURE: 70 MMHG | WEIGHT: 215 LBS | OXYGEN SATURATION: 96 % | SYSTOLIC BLOOD PRESSURE: 116 MMHG | BODY MASS INDEX: 30.1 KG/M2

## 2025-03-12 DIAGNOSIS — I86.8 CHRONIC ANEURYSM OF RIGHT POPLITEAL VEIN: ICD-10-CM

## 2025-03-12 DIAGNOSIS — I73.9 PERIPHERAL VASCULAR DISEASE, UNSPECIFIED: Primary | ICD-10-CM

## 2025-03-12 PROCEDURE — 1123F ACP DISCUSS/DSCN MKR DOCD: CPT | Performed by: SURGERY

## 2025-03-12 PROCEDURE — 99214 OFFICE O/P EST MOD 30 MIN: CPT | Performed by: SURGERY

## 2025-03-12 NOTE — PROGRESS NOTES
the following from [facility]: Outside name: valACYclovir (VALTREX) 500 mg tablet       No current facility-administered medications for this visit.     Allergies   Allergen Reactions    Dutasteride Cough and Shortness Of Breath     Lightheaded    Rosuvastatin Other (See Comments)     'flu'    Oxycodone Nausea And Vomiting      Social History     Tobacco Use    Smoking status: Never    Smokeless tobacco: Never   Substance Use Topics    Alcohol use: Yes     Alcohol/week: 0.0 standard drinks of alcohol     Family History   Problem Relation Age of Onset    Diabetes Sister     Heart Disease Father     Hypertension Father     Diabetes Maternal Grandmother     Diabetes Brother